# Patient Record
Sex: FEMALE | Race: WHITE | NOT HISPANIC OR LATINO
[De-identification: names, ages, dates, MRNs, and addresses within clinical notes are randomized per-mention and may not be internally consistent; named-entity substitution may affect disease eponyms.]

---

## 2022-05-02 ENCOUNTER — RESULT REVIEW (OUTPATIENT)
Age: 52
End: 2022-05-02

## 2022-05-19 ENCOUNTER — RESULT REVIEW (OUTPATIENT)
Age: 52
End: 2022-05-19

## 2022-09-26 ENCOUNTER — TRANSCRIPTION ENCOUNTER (OUTPATIENT)
Age: 52
End: 2022-09-26

## 2022-09-26 ENCOUNTER — APPOINTMENT (OUTPATIENT)
Dept: AFTER HOURS CARE | Facility: EMERGENCY ROOM | Age: 52
End: 2022-09-26

## 2022-09-26 DIAGNOSIS — U07.1 COVID-19: ICD-10-CM

## 2022-09-26 PROBLEM — Z00.00 ENCOUNTER FOR PREVENTIVE HEALTH EXAMINATION: Status: ACTIVE | Noted: 2022-09-26

## 2022-09-26 PROCEDURE — 99204 OFFICE O/P NEW MOD 45 MIN: CPT | Mod: 95

## 2022-09-26 RX ORDER — NIRMATRELVIR AND RITONAVIR 300-100 MG
20 X 150 MG & KIT ORAL
Qty: 30 | Refills: 0 | Status: ACTIVE | COMMUNITY
Start: 2022-09-26 | End: 1900-01-01

## 2022-09-26 NOTE — ASSESSMENT
[FreeTextEntry1] : 52y F w/ PMHx Anxiety presenting with sore throat, cough, fever, headache, myalgias x4 days, worsening in nature. Patient uncomfortable appearing, horse, persistent coughing. With positive Covid and severity of symptoms although patient has otherwise no risk factors will prescribe Paxlovid. Patient instructed to increase her use of Advil and add Tylenol for symptomatic treatment, humidifier, tea and honey, throat lozenges. Patient states she has a primary care doctor that she will schedule follow up with. Patient isn't within window to start back so that as today is day five. Patient has pulse ox at home and is comfortable utilizing it and was instructed on having to go to the hospital if you're reading was below 93%.

## 2022-09-26 NOTE — REVIEW OF SYSTEMS
[Fever] : fever [Chills] : chills [Fatigue] : fatigue [Nasal Discharge] : nasal discharge [Sore Throat] : sore throat [Postnasal Drip] : postnasal drip [Chest Pain] : no chest pain [Palpitations] : no palpitations [Shortness Of Breath] : no shortness of breath [Cough] : cough [Abdominal Pain] : no abdominal pain [Nausea] : no nausea [Diarrhea] : diarrhea [Vomiting] : no vomiting [Dysuria] : no dysuria [Hematuria] : no hematuria [Joint Pain] : no joint pain [Skin Rash] : no skin rash [Headache] : headache

## 2022-09-26 NOTE — HISTORY OF PRESENT ILLNESS
[Home] : at home, [unfilled] , at the time of the visit. [Other Location: e.g. Home (Enter Location, City,State)___] : at [unfilled] [Verbal consent obtained from patient] : the patient, [unfilled] [FreeTextEntry8] : 52y F w/ PMHx Anxiety presenting with sore throat, cough, fever, headache, myalgias x4 days, worsening in nature. Patient states she does not like taking medications, has only been taking one Advil per day for her symptoms. States her son was experiencing nasal congestion the beginning of last week I believe she was exposed to Covid through him. Denies visual changes, chest pain, shortness of breath, nausea vomiting, abdominal pain, urinary symptoms, diarrhea or bloody stools. Patient states her symptoms have been worsening though, states that she is unable to sleep at night due to severe congestion and headache. States her last fever was two days ago. States she did receive the Covid vaccine and booster. Denies smoking history.

## 2022-09-26 NOTE — PLAN
[With new medications prescribed] : Treat in place: with new medications prescribed [FreeTextEntry1] : -Rx Paxlovid\par -Tylenol and Motrin prn\par -Tea w/ honey, throat lozenges\par -continue monitoring with pulse ox if development of shortness of breath\par -anticipatory guidance discuss with patient and red flag symptoms on when to go to the emergency Department

## 2022-09-26 NOTE — PHYSICAL EXAM
[No Acute Distress] : no acute distress [Ill-Appearing] : ill-appearing [Normal Sclera/Conjunctiva] : normal sclera/conjunctiva [Normal Outer Ear/Nose] : the outer ears and nose were normal in appearance [Normal Oropharynx] : the oropharynx was normal [No Respiratory Distress] : no respiratory distress  [Speech Grossly Normal] : speech grossly normal [Normal Affect] : the affect was normal

## 2024-05-30 ENCOUNTER — RESULT REVIEW (OUTPATIENT)
Age: 54
End: 2024-05-30